# Patient Record
Sex: MALE
[De-identification: names, ages, dates, MRNs, and addresses within clinical notes are randomized per-mention and may not be internally consistent; named-entity substitution may affect disease eponyms.]

---

## 2019-08-23 PROBLEM — Z00.00 ENCOUNTER FOR PREVENTIVE HEALTH EXAMINATION: Status: ACTIVE | Noted: 2019-08-23

## 2019-10-01 ENCOUNTER — APPOINTMENT (OUTPATIENT)
Dept: ORTHOPEDIC SURGERY | Facility: CLINIC | Age: 34
End: 2019-10-01
Payer: COMMERCIAL

## 2019-10-01 VITALS
HEIGHT: 67 IN | WEIGHT: 174 LBS | DIASTOLIC BLOOD PRESSURE: 63 MMHG | BODY MASS INDEX: 27.31 KG/M2 | HEART RATE: 57 BPM | SYSTOLIC BLOOD PRESSURE: 107 MMHG

## 2019-10-01 DIAGNOSIS — Z78.9 OTHER SPECIFIED HEALTH STATUS: ICD-10-CM

## 2019-10-01 PROCEDURE — 99203 OFFICE O/P NEW LOW 30 MIN: CPT

## 2019-10-14 NOTE — PHYSICAL EXAM
[LE] : Sensory: Intact in bilateral lower extremities [DP] : dorsalis pedis 2+ and symmetric bilaterally [PT] : posterior tibial 2+ and symmetric bilaterally [Normal RLE] : Right Lower Extremity: No scars, rashes, lesions, ulcers, skin intact [Normal LLE] : Left Lower Extremity: No scars, rashes, lesions, ulcers, skin intact [Normal Touch] : sensation intact for touch [Normal] : Oriented to person, place, and time, insight and judgement were intact and the affect was normal [Obese] : not obese [de-identified] : No respiratory distress [de-identified] : Knees:\par Nonantalgic gait. squat fully with minimal discomfort on the RIGHT\par Negative effusion.\par - erythema, edema, warmth.\par Tender RIGHT knee Mildly on the lateral joint. There is a distinct fullness along the lateral joint line that feels like a lateral meniscus cyst or fluid around the ITB. Nontender over the lateral epicondyle RIGHT knee.Nontender LEFT knee\par ROM: 0 degrees extension to 135 degrees flexion. No crepitus\par - Sharmaine.\par 1A Lachman.  - Pivot shift. - posterior drawer. Normal rotational, varus/valgus laxity.\par Intact extensor mechanism.\par NVI distally.\par  [de-identified] : \par X-rays RIGHT knee on August 12, 2019 3 views were negative. Report is in the chart.

## 2019-10-14 NOTE — DISCUSSION/SUMMARY
[de-identified] : 34-year-old who runs and cycles with RIGHT lateral knee pain with a fullness on the lateral joint line that feels consistent with a lateral meniscal cyst. To confirm that this is a cyst and not a mass I recommended an MRI. MRI will also shows there is a lateral meniscus tear causing his current symptoms versus possible ITB syndrome. He was referred to physical therapy. Heat and ice and ibuprofen as needed. Stretching and strengthening. He can bike or run short distances it fits pain-free.\par I will call him with the MRI results and he should followup after the physical therapy in about 6-8 weeks.\par

## 2019-10-14 NOTE — HISTORY OF PRESENT ILLNESS
[de-identified] : Danie is a 34-year-old male who comes in with pain in his RIGHT knee that began several months ago. He did not have a specific injury but had been cycling and running a fair bit and developed pain in the lateral knee. He had noticed a bump over the lateral knee in April but initially wasn't hurting.Pain is intermittent and variable but can be moderate, 6/10 when it occurs Pain is worse with walking and cycling. It's better with ice and rest. He tried a foam roller. He had x-rays done a couple months ago which were negative. He uses Epsom salts as well. He hasn't taken medication or gone to physical therapy.\par He had a torn meniscus in 2014 in the RIGHT knee and had surgery for it.

## 2019-11-12 PROBLEM — S83.281A TEAR OF LATERAL MENISCUS OF RIGHT KNEE, CURRENT, UNSPECIFIED TEAR TYPE, INITIAL ENCOUNTER: Status: ACTIVE | Noted: 2019-11-12

## 2019-11-12 PROBLEM — M76.31 ILIOTIBIAL BAND SYNDROME OF RIGHT SIDE: Status: RESOLVED | Noted: 2019-10-01 | Resolved: 2019-11-12

## 2019-11-12 PROBLEM — M23.000 CYST OF LATERAL MENISCUS OF RIGHT KNEE: Status: ACTIVE | Noted: 2019-10-01

## 2019-11-13 ENCOUNTER — APPOINTMENT (OUTPATIENT)
Dept: ORTHOPEDIC SURGERY | Facility: CLINIC | Age: 34
End: 2019-11-13

## 2019-11-13 DIAGNOSIS — M23.000: ICD-10-CM

## 2019-11-13 DIAGNOSIS — M76.31 ILIOTIBIAL BAND SYNDROME, RIGHT LEG: ICD-10-CM

## 2019-11-13 DIAGNOSIS — S83.281A OTHER TEAR OF LATERAL MENISCUS, CURRENT INJURY, RIGHT KNEE, INITIAL ENCOUNTER: ICD-10-CM

## 2021-06-11 ENCOUNTER — RESULT REVIEW (OUTPATIENT)
Age: 36
End: 2021-06-11

## 2021-06-11 ENCOUNTER — APPOINTMENT (OUTPATIENT)
Dept: ORTHOPEDIC SURGERY | Facility: CLINIC | Age: 36
End: 2021-06-11
Payer: COMMERCIAL

## 2021-06-11 ENCOUNTER — OUTPATIENT (OUTPATIENT)
Dept: OUTPATIENT SERVICES | Facility: HOSPITAL | Age: 36
LOS: 1 days | End: 2021-06-11
Payer: COMMERCIAL

## 2021-06-11 DIAGNOSIS — S52.202A UNSPECIFIED FRACTURE OF SHAFT OF LEFT ULNA, INITIAL ENCOUNTER FOR CLOSED FRACTURE: ICD-10-CM

## 2021-06-11 DIAGNOSIS — S42.91XA FRACTURE OF RIGHT SHOULDER GIRDLE, PART UNSPECIFIED, INITIAL ENCOUNTER FOR CLOSED FRACTURE: ICD-10-CM

## 2021-06-11 DIAGNOSIS — S52.302A UNSPECIFIED FRACTURE OF SHAFT OF LEFT ULNA, INITIAL ENCOUNTER FOR CLOSED FRACTURE: ICD-10-CM

## 2021-06-11 PROCEDURE — 99214 OFFICE O/P EST MOD 30 MIN: CPT

## 2021-06-11 PROCEDURE — 73030 X-RAY EXAM OF SHOULDER: CPT

## 2021-06-11 PROCEDURE — 73090 X-RAY EXAM OF FOREARM: CPT | Mod: 26,LT

## 2021-06-11 PROCEDURE — 73030 X-RAY EXAM OF SHOULDER: CPT | Mod: 26,RT

## 2021-06-11 PROCEDURE — 73090 X-RAY EXAM OF FOREARM: CPT

## 2021-06-23 ENCOUNTER — APPOINTMENT (OUTPATIENT)
Dept: ORTHOPEDIC SURGERY | Facility: CLINIC | Age: 36
End: 2021-06-23
Payer: COMMERCIAL

## 2021-06-23 VITALS — RESPIRATION RATE: 16 BRPM | WEIGHT: 174 LBS | HEIGHT: 67 IN | BODY MASS INDEX: 27.31 KG/M2

## 2021-06-23 DIAGNOSIS — S52.209A UNSPECIFIED FRACTURE OF UNSPECIFIED FOREARM, INITIAL ENCOUNTER FOR CLOSED FRACTURE: ICD-10-CM

## 2021-06-23 DIAGNOSIS — S52.90XA UNSPECIFIED FRACTURE OF UNSPECIFIED FOREARM, INITIAL ENCOUNTER FOR CLOSED FRACTURE: ICD-10-CM

## 2021-06-23 PROCEDURE — 99214 OFFICE O/P EST MOD 30 MIN: CPT

## 2021-06-23 PROCEDURE — 73070 X-RAY EXAM OF ELBOW: CPT | Mod: LT

## 2021-07-21 ENCOUNTER — APPOINTMENT (OUTPATIENT)
Dept: ORTHOPEDIC SURGERY | Facility: CLINIC | Age: 36
End: 2021-07-21

## 2021-07-27 ENCOUNTER — APPOINTMENT (OUTPATIENT)
Dept: ORTHOPEDIC SURGERY | Facility: CLINIC | Age: 36
End: 2021-07-27